# Patient Record
Sex: MALE | Race: BLACK OR AFRICAN AMERICAN | Employment: FULL TIME | URBAN - METROPOLITAN AREA
[De-identification: names, ages, dates, MRNs, and addresses within clinical notes are randomized per-mention and may not be internally consistent; named-entity substitution may affect disease eponyms.]

---

## 2021-11-29 ENCOUNTER — OFFICE VISIT (OUTPATIENT)
Dept: ORTHOPEDIC SURGERY | Age: 46
End: 2021-11-29
Payer: COMMERCIAL

## 2021-11-29 VITALS — BODY MASS INDEX: 31.08 KG/M2 | HEIGHT: 71 IN | WEIGHT: 222 LBS

## 2021-11-29 DIAGNOSIS — S76.011A STRAIN OF FLEXOR MUSCLE OF RIGHT HIP, INITIAL ENCOUNTER: ICD-10-CM

## 2021-11-29 DIAGNOSIS — S76.011A STRAIN OF HIP ADDUCTOR MUSCLE, RIGHT, INITIAL ENCOUNTER: ICD-10-CM

## 2021-11-29 DIAGNOSIS — M25.551 RIGHT HIP PAIN: Primary | ICD-10-CM

## 2021-11-29 DIAGNOSIS — R10.31 RIGHT GROIN PAIN: ICD-10-CM

## 2021-11-29 DIAGNOSIS — M79.604 RIGHT LEG PAIN: ICD-10-CM

## 2021-11-29 PROCEDURE — 99204 OFFICE O/P NEW MOD 45 MIN: CPT | Performed by: ORTHOPAEDIC SURGERY

## 2021-11-29 RX ORDER — METHYLPREDNISOLONE 4 MG/1
TABLET ORAL
Qty: 1 DOSE PACK | Refills: 0 | Status: SHIPPED | OUTPATIENT
Start: 2021-11-29 | End: 2022-07-24

## 2021-11-29 NOTE — PROGRESS NOTES
Anthony Palacios (: 1975) is a 51 y.o. male, patient, here for evaluation of the following chief complaint(s):  Leg Pain (right) and Hip Pain (right)       HPI:    He began having increased right hip and leg pain yesterday when he was injured on 2021. He gives no detail or description of his injury. The patient gives no detail or description of his discomfort. He does report that lying in bed makes pain worse and ice makes his pain better. He has been taking cyclobenzaprine and Tylenol for his discomfort as needed. He was not seen in the emergency room for his right leg or hip pain and reports no previous or related surgical intervention. No Known Allergies    Current Outpatient Medications   Medication Sig    methylPREDNISolone (MEDROL DOSEPACK) 4 mg tablet Per dose pack instructions    amoxicillin (AMOXIL) 500 mg capsule Take 2 Caps by mouth two (2) times a day.  butalbital-acetaminophen-caffeine (FIORICET) -40 mg per tablet Take 0.5-1 Tabs by mouth every six (6) hours as needed for Headache. Max Daily Amount: 4 Tabs.  ondansetron (ZOFRAN ODT) 4 mg disintegrating tablet Take 1 Tab by mouth every eight (8) hours as needed for Nausea. No current facility-administered medications for this visit. Past Medical History:   Diagnosis Date    Abdominal pain, right upper quadrant 2010    Cholelithiases 2010        Past Surgical History:   Procedure Laterality Date    HX CHOLECYSTECTOMY  8/20/10    lap jersey w/gram       No family history on file. Social History     Socioeconomic History    Marital status:      Spouse name: Not on file    Number of children: Not on file    Years of education: Not on file    Highest education level: Not on file   Occupational History    Not on file   Tobacco Use    Smoking status: Not on file    Smokeless tobacco: Not on file   Substance and Sexual Activity    Alcohol use:  Yes     Alcohol/week: 2.5 standard drinks     Types: 3 Cans of beer per week    Drug use: Not on file    Sexual activity: Not on file   Other Topics Concern    Not on file   Social History Narrative    Not on file     Social Determinants of Health     Financial Resource Strain:     Difficulty of Paying Living Expenses: Not on file   Food Insecurity:     Worried About Running Out of Food in the Last Year: Not on file    Felipe of Food in the Last Year: Not on file   Transportation Needs:     Lack of Transportation (Medical): Not on file    Lack of Transportation (Non-Medical): Not on file   Physical Activity:     Days of Exercise per Week: Not on file    Minutes of Exercise per Session: Not on file   Stress:     Feeling of Stress : Not on file   Social Connections:     Frequency of Communication with Friends and Family: Not on file    Frequency of Social Gatherings with Friends and Family: Not on file    Attends [employer] Services: Not on file    Active Member of [address] [place] [address] or Organizations: Not on file    Attends Club or Organization Meetings: Not on file    Marital Status: Not on file   Intimate Partner Violence:     Fear of Current or Ex-Partner: Not on file    Emotionally Abused: Not on file    Physically Abused: Not on file    Sexually Abused: Not on file   Housing Stability:     Unable to Pay for Housing in the Last Year: Not on file    Number of [place] in the Last Year: Not on file    Unstable Housing in the Last Year: Not on file       Review of Systems   All other systems reviewed and are negative. Vitals:  Ht 5' 10.5\" (1.791 m)   Wt 222 lb (100.7 kg)   BMI 31.40 kg/m²    Body mass index is 31.4 kg/m². Ortho Exam     The patient is well-developed and well-nourished. The patient presents today alert and oriented x3 and with a normal mood and affect. The patient stands with a normal weightbearing line but walks with a slightly antalgic gait because of his right hip and leg pain.     Right hip, the patient is nontender to palpation along the lateral aspect of the greater trochanter and the bursa, and has no soft tissue swelling. The patient is tender to palpation over the anterior aspect of the hip, and has discomfort with resisted hip flexion. The patient mild discomfort with impingement maneuvers, and the hip is stable. They full range of motion. They have a negative straight leg raise test. They have 5/5 strength, and are neurovascularly intact distally. There is no erythema, warmth or skin lesions present. ASSESSMENT/PLAN:      1. Right hip pain  -     XR HIP RT W OR WO PELV 2-3 VWS; Future  2. Right leg pain  3. Strain of hip adductor muscle, right, initial encounter  -     REFERRAL TO PHYSICAL THERAPY  4. Strain of flexor muscle of right hip, initial encounter  -     REFERRAL TO PHYSICAL THERAPY  5. Right groin pain     XR Results (most recent):  Results from Appointment encounter on 11/29/21    XR HIP RT W OR WO PELV 2-3 VWS    Narrative  View x-rays of his right hip show no evidence of a fracture or dislocation. There is no evidence of degenerative disease although he does have a small cam lesion. Below is the assessment and plan developed based on review of pertinent history, physical exam, labs, studies, and medications. **The patient was referred to formal physical therapy. **    We discussed the patient's right hip and leg pain and his signs, symptoms, physical exam, description of his pain, description of his injury, and x-rays are consistent with a abductor and hip flexor strain. The possible treatment options were discussed with the patient and we elected to treat his pain conservatively at this point with rest, ice, elevation, activity modification, and a Medrol Dosepak. The patient was given a prescription for Medrol Dosepak which he will use as directed. While taking his Medrol Dosepak, the patient will discontinue use of anti-inflammatory medication.   Once his Jovanyrol Steven Henson is complete, the patient may resume anti-inflammatories at that time. The patient was also referred to formal physical therapy to work on range of motion, strengthening, and stretching exercises. He will also work on these exercises with an at-home exercise program as pain tolerates. I will see him back in 4 weeks for reevaluation if he continues to have persistence of his pain however, if his pain has improved and is well-maintained I will see him back on an as-needed basis at which point we will discuss alternative and likely more aggressive treatment options. Return in about 4 weeks (around 12/27/2021), or if symptoms worsen or fail to improve, for Reevaluation and further discussion. .    An electronic signature was used to authenticate this note.   -- Bessie Kocher

## 2022-05-31 ENCOUNTER — OFFICE VISIT (OUTPATIENT)
Dept: ORTHOPEDIC SURGERY | Age: 47
End: 2022-05-31
Payer: COMMERCIAL

## 2022-05-31 DIAGNOSIS — M25.561 CHRONIC PAIN OF RIGHT KNEE: Primary | ICD-10-CM

## 2022-05-31 DIAGNOSIS — M70.41 PREPATELLAR BURSITIS OF RIGHT KNEE: ICD-10-CM

## 2022-05-31 DIAGNOSIS — M25.461: ICD-10-CM

## 2022-05-31 DIAGNOSIS — G89.29 CHRONIC PAIN OF RIGHT KNEE: Primary | ICD-10-CM

## 2022-05-31 PROCEDURE — 20610 DRAIN/INJ JOINT/BURSA W/O US: CPT | Performed by: ORTHOPAEDIC SURGERY

## 2022-05-31 PROCEDURE — 99214 OFFICE O/P EST MOD 30 MIN: CPT | Performed by: ORTHOPAEDIC SURGERY

## 2022-06-01 VITALS — BODY MASS INDEX: 30.24 KG/M2 | HEIGHT: 71 IN | WEIGHT: 216 LBS

## 2022-06-01 RX ORDER — TRIAMCINOLONE ACETONIDE 40 MG/ML
40 INJECTION, SUSPENSION INTRA-ARTICULAR; INTRAMUSCULAR ONCE
Status: COMPLETED | OUTPATIENT
Start: 2022-06-01 | End: 2022-06-01

## 2022-06-01 RX ORDER — BUPIVACAINE HYDROCHLORIDE 5 MG/ML
2 INJECTION, SOLUTION PERINEURAL ONCE
Status: COMPLETED | OUTPATIENT
Start: 2022-06-01 | End: 2022-06-01

## 2022-06-01 RX ADMIN — TRIAMCINOLONE ACETONIDE 40 MG: 40 INJECTION, SUSPENSION INTRA-ARTICULAR; INTRAMUSCULAR at 09:05

## 2022-06-01 RX ADMIN — BUPIVACAINE HYDROCHLORIDE 10 MG: 5 INJECTION, SOLUTION PERINEURAL at 09:04

## 2022-06-01 NOTE — PROGRESS NOTES
Susana Chávez (: 1975) is a 49 y.o. male, patient, here for evaluation of the following chief complaint(s):  Knee Pain (right)       HPI:    He began having increased right knee pain on 3/20/2022. The patient states that he slipped at his home on a wet floor. The patient describes his right knee pain now as moderate and dull. His right knee pain does not wake him up from sleep at night. The patient has been experiencing some swelling. He states that his right knee pain continues to get worse. He reports that kneeling makes his pain worse and not kneeling or putting any pressure on his knee makes his pain better. He has been taking over-the-counter medication. He has been wearing the brace for comfort, stability, and support. He was seen in urgent care facility prior to his visit today. The patient did have x-rays performed prior to his visit today. He reports no previous or related right knee surgery. No Known Allergies    Current Outpatient Medications   Medication Sig    methylPREDNISolone (MEDROL DOSEPACK) 4 mg tablet Per dose pack instructions    amoxicillin (AMOXIL) 500 mg capsule Take 2 Caps by mouth two (2) times a day.  butalbital-acetaminophen-caffeine (FIORICET) -40 mg per tablet Take 0.5-1 Tabs by mouth every six (6) hours as needed for Headache. Max Daily Amount: 4 Tabs.  ondansetron (ZOFRAN ODT) 4 mg disintegrating tablet Take 1 Tab by mouth every eight (8) hours as needed for Nausea. No current facility-administered medications for this visit. Past Medical History:   Diagnosis Date    Abdominal pain, right upper quadrant 2010    Cholelithiases 2010        Past Surgical History:   Procedure Laterality Date    HX CHOLECYSTECTOMY  8/20/10    lap jersey w/gram       History reviewed. No pertinent family history.      Social History     Socioeconomic History    Marital status:      Spouse name: Not on file    Number of children: Not on file  Years of education: Not on file    Highest education level: Not on file   Occupational History    Not on file   Tobacco Use    Smoking status: Not on file    Smokeless tobacco: Not on file   Substance and Sexual Activity    Alcohol use: Yes     Alcohol/week: 2.5 standard drinks     Types: 3 Cans of beer per week    Drug use: Not on file    Sexual activity: Not on file   Other Topics Concern    Not on file   Social History Narrative    Not on file     Social Determinants of Health     Financial Resource Strain:     Difficulty of Paying Living Expenses: Not on file   Food Insecurity:     Worried About Running Out of Food in the Last Year: Not on file    Felipe of Food in the Last Year: Not on file   Transportation Needs:     Lack of Transportation (Medical): Not on file    Lack of Transportation (Non-Medical): Not on file   Physical Activity:     Days of Exercise per Week: Not on file    Minutes of Exercise per Session: Not on file   Stress:     Feeling of Stress : Not on file   Social Connections:     Frequency of Communication with Friends and Family: Not on file    Frequency of Social Gatherings with Friends and Family: Not on file    Attends Protestant Services: Not on file    Active Member of [address] [place] [address] [employer] or Organizations: Not on file    Attends Club or Organization Meetings: Not on file    Marital Status: Not on file   Intimate Partner Violence:     Fear of Current or Ex-Partner: Not on file    Emotionally Abused: Not on file    Physically Abused: Not on file    Sexually Abused: Not on file   Housing Stability:     Unable to Pay for Housing in the Last Year: Not on file    Number of [place] in the Last Year: Not on file    Unstable Housing in the Last Year: Not on file       Review of Systems   All other systems reviewed and are negative. Vitals:  Ht 5' 10.5\" (1.791 m)   Wt 216 lb (98 kg)   BMI 30.55 kg/m²    Body mass index is 30.55 kg/m².     Ortho Exam     The patient is well-developed and well-nourished. The patient presents today in alert and oriented x3 with a normal mood and affect. The patient stands with a normal weightbearing line but walks with a slightly antalgic gait because of his right knee pain. Right knee, the patient is nontender to palpation along the medial and lateral joint lines, and has moderate size bursal effusion. There is soft tissue swelling within the prepatellar bursa, but no erythema or warmth. They are tender to palpation on the prepatella busra. They have no crepitus of the patellofemoral joint with range of motion. The patient has no discomfort with Mateusz's maneuvers, and the knee is stable. They have full range of motion. They have 5/5 strength, and are neurovascularly intact distally. There are no skin lesions present. ASSESSMENT/PLAN:      1. Chronic pain of right knee  2. Prepatellar bursitis of right knee  -     bupivacaine HCl (MARCAINE) 0.5 % (5 mg/mL) injection 10 mg; 10 mg (2 mL), Other, ONCE, 1 dose, On Wed 6/1/22 at 1000  -     triamcinolone acetonide (KENALOG-40) 40 mg/mL injection 40 mg; 40 mg, IntraBURSal, ONCE, 1 dose, On Wed 6/1/22 at 1000  3. Prepatellar effusion, right       Below is the assessment and plan developed based on review of pertinent history, physical exam, labs, studies, and medications. We discussed the patient's ongoing and worsening right knee pain. His signs, symptoms, physical exam, description of his pain, and x-rays from an outside facility are consistent with prepatellar bursitis and a patellar bursal effusion. The possible treatment options were discussed with the patient and because of an effusion present we elected to aspirate and inject his prepatellar bursa with cortisone today to try and alleviate some of his discomfort.   The risks and benefits of the aspiration and injection were discussed in detail with the patient and under sterile prep the patient's right prepatellar bursa was aspirated of approximately 35 ccs of bloody sanguinous fluid and injected with 2 ccs of 0.5% Sensorcaine and 1 cc of 40 mg/cc of Kenalog. He tolerated both the aspiration and injection well. I did encourage him to continue to ice and elevate when possible, modify his activity level based on his right knee pain, monitor his effusion, and use anti-inflammatory medication when necessary. He will work on range of motion, strengthening, and stretching exercises with an at-home exercise program as pain tolerates. He is to avoid any deep knee bend activities against resistance, squatting, kneeling, stairs, lunging, and high impact loading activities. I will see him back in 2 weeks for reevaluation if he has continued persistence of his pain or any redness around his prepatellar bursa however, if his pain has improved and is well-maintained I will see him back on an as-needed basis. Return in about 2 weeks (around 6/14/2022) for Re-evaluation and further discussion if he has persistence of his pain. An electronic signature was used to authenticate this note.   -- Lujean Lanes, MD

## 2022-07-18 ENCOUNTER — OFFICE VISIT (OUTPATIENT)
Dept: INTERNAL MEDICINE CLINIC | Age: 47
End: 2022-07-18
Payer: COMMERCIAL

## 2022-07-18 VITALS
TEMPERATURE: 98 F | BODY MASS INDEX: 29.41 KG/M2 | HEART RATE: 71 BPM | HEIGHT: 71 IN | WEIGHT: 210.1 LBS | OXYGEN SATURATION: 95 % | DIASTOLIC BLOOD PRESSURE: 86 MMHG | RESPIRATION RATE: 16 BRPM | SYSTOLIC BLOOD PRESSURE: 127 MMHG

## 2022-07-18 DIAGNOSIS — E11.9 CONTROLLED TYPE 2 DIABETES MELLITUS WITHOUT COMPLICATION, WITHOUT LONG-TERM CURRENT USE OF INSULIN (HCC): ICD-10-CM

## 2022-07-18 DIAGNOSIS — I10 PRIMARY HYPERTENSION: Primary | ICD-10-CM

## 2022-07-18 DIAGNOSIS — E66.3 OVERWEIGHT (BMI 25.0-29.9): ICD-10-CM

## 2022-07-18 DIAGNOSIS — J30.0 VASOMOTOR RHINITIS: ICD-10-CM

## 2022-07-18 DIAGNOSIS — Z00.00 PHYSICAL EXAM: ICD-10-CM

## 2022-07-18 DIAGNOSIS — E78.5 DYSLIPIDEMIA: ICD-10-CM

## 2022-07-18 DIAGNOSIS — N40.0 BENIGN PROSTATIC HYPERPLASIA WITHOUT LOWER URINARY TRACT SYMPTOMS: ICD-10-CM

## 2022-07-18 PROCEDURE — 99204 OFFICE O/P NEW MOD 45 MIN: CPT | Performed by: INTERNAL MEDICINE

## 2022-07-18 PROCEDURE — 99386 PREV VISIT NEW AGE 40-64: CPT | Performed by: INTERNAL MEDICINE

## 2022-07-18 RX ORDER — CLONIDINE 0.2 MG/24H
PATCH, EXTENDED RELEASE TRANSDERMAL
COMMUNITY

## 2022-07-18 RX ORDER — MONTELUKAST SODIUM 10 MG/1
10 TABLET ORAL AS NEEDED
COMMUNITY

## 2022-07-18 RX ORDER — AMLODIPINE BESYLATE 10 MG/1
TABLET ORAL
COMMUNITY

## 2022-07-18 RX ORDER — NAPROXEN 500 MG/1
TABLET ORAL
COMMUNITY

## 2022-07-18 RX ORDER — PANTOPRAZOLE SODIUM 40 MG/1
TABLET, DELAYED RELEASE ORAL
COMMUNITY
Start: 2022-07-07

## 2022-07-18 RX ORDER — HYDROCHLOROTHIAZIDE 50 MG/1
TABLET ORAL
COMMUNITY
Start: 2022-07-06

## 2022-07-18 RX ORDER — SUCRALFATE 1 G/1
TABLET ORAL
COMMUNITY
Start: 2022-07-06

## 2022-07-18 RX ORDER — LOSARTAN POTASSIUM 100 MG/1
TABLET ORAL
COMMUNITY
Start: 2022-07-06

## 2022-07-18 NOTE — PROGRESS NOTES
Identified pt with two pt identifiers(name and ). Reviewed record in preparation for visit and have obtained necessary documentation. Chief Complaint   Patient presents with    New Patient     est care for diabetes        Health Maintenance Due   Topic    Hepatitis C Screening     Depression Screen     COVID-19 Vaccine (1)    DTaP/Tdap/Td series (1 - Tdap)    Lipid Screen     Colorectal Cancer Screening Combo      Vitals:    22 1551   BP: 127/86   Pulse: 71   Resp: 16   Temp: 98 °F (36.7 °C)   TempSrc: Oral   SpO2: 95%   Weight: 210 lb 1.6 oz (95.3 kg)   Height: 5' 10.5\" (1.791 m)   PainSc:   0 - No pain       Pain Scale: 0 - No pain/10        Coordination of Care Questionnaire:  :     1. Have you been to the ER, urgent care clinic since your last visit? Hospitalized since your last visit? Yes [facility]x    2. Have you seen or consulted any other health care providers outside of the [address] [place] [address] since your last visit? Include any pap smears or colon screening. No    3. For patients aged 39-70: Has the patient had a colonoscopy / FIT/ Cologuard? Yes - no Care Gap present      If the patient is female:    4. For patients aged 41-77: Has the patient had a mammogram within the past 2 years? NA - based on age or sex      11. For patients aged 21-65: Has the patient had a pap smear?  NA - based on age or sex

## 2022-07-18 NOTE — PROGRESS NOTES
[facility]  [place] [address]  [address] [place] [address]  Phone:  [phone]  Fax: [phone]       Chief Complaint   Patient presents with    New Patient     est care for diabetes   . SUBJECTIVE:    Kashif Lal is a 49 y.o. male comes in as a new patient. He has a two-year plus history of hypertension and he is currently under the care of a cardiologist for treatment of this. At the same time he was diagnosed with diabetes mellitus. Blood sugars are generally less than 200. He was placed on metformin and he was intolerant of this. Since stopping it, his GI symptoms have completely abated which were primarily lower tract. He is overweight as he has been for a number of years. Current Outpatient Medications   Medication Sig Dispense Refill    amLODIPine (NORVASC) 10 mg tablet amlodipine 10 mg tablet   TAKE 1 TABLET BY MOUTH EVERY DAY      cloNIDine (CATAPRES) 0.2 mg/24 hr ptwk clonidine 0.2 mg/24 hr weekly transdermal patch   APPLY 1 PATCH EXTERNALLY TO THE SKIN EVERY WEEK      hydroCHLOROthiazide (HYDRODIURIL) 50 mg tablet       losartan (COZAAR) 100 mg tablet       naproxen (NAPROSYN) 500 mg tablet naproxen 500 mg tablet   TAKE 1 TABLET BY MOUTH TWICE DAILY      pantoprazole (PROTONIX) 40 mg tablet       sucralfate (CARAFATE) 1 gram tablet       montelukast (Singulair) 10 mg tablet Take 10 mg by mouth as needed. methylPREDNISolone (MEDROL DOSEPACK) 4 mg tablet Per dose pack instructions (Patient not taking: Reported on 7/18/2022) 1 Dose Pack 0    amoxicillin (AMOXIL) 500 mg capsule Take 2 Caps by mouth two (2) times a day. (Patient not taking: Reported on 7/18/2022) 40 Cap 0    butalbital-acetaminophen-caffeine (FIORICET) -40 mg per tablet Take 0.5-1 Tabs by mouth every six (6) hours as needed for Headache. Max Daily Amount: 4 Tabs.  (Patient not taking: Reported on 7/18/2022) 10 Tab 0    ondansetron (ZOFRAN ODT) 4 mg disintegrating tablet Take 1 Tab by mouth every eight (8) hours as needed for Nausea.  (Patient not taking: Reported on 7/18/2022) 6 Tab 0     Past Medical History:   Diagnosis Date    Abdominal pain, right upper quadrant 8/27/2010    Cholelithiases 8/27/2010    GERD (gastroesophageal reflux disease)     Sleep apnea      Past Surgical History:   Procedure Laterality Date    HX CHOLECYSTECTOMY  8/20/10    lap jersey w/gram     Allergies   Allergen Reactions    Pecan Nut Hives     Itchy throat         REVIEW OF SYSTEMS:  General: negative for - chills or fever  ENT: negative for - headaches, nasal congestion or tinnitus  Respiratory: negative for - cough, hemoptysis, shortness of breath or wheezing  Cardiovascular : negative for - chest pain, edema, palpitations or shortness of breath  Gastrointestinal: negative for - abdominal pain, blood in stools, heartburn or nausea/vomiting  Genito-Urinary: no dysuria, trouble voiding, or hematuria  Musculoskeletal: negative for - gait disturbance, joint pain, joint stiffness or joint swelling  Neurological: no TIA or stroke symptoms  Hematologic: no bruises, no bleeding, no swollen glands  Integument: no lumps, mole changes, nail changes or rash  Endocrine: no malaise/lethargy or unexpected weight changes      Social History     Socioeconomic History    Marital status:    Tobacco Use    Smoking status: Never Smoker    Smokeless tobacco: Never Used   Vaping Use    Vaping Use: Never used   Substance and Sexual Activity    Alcohol use: Yes     Comment: occ    Drug use: Never     Family History   Problem Relation Age of Onset    Diabetes Father     Neuropathy Father     Diabetes Maternal Grandmother     Stomach Cancer Maternal Grandfather     Diabetes Paternal Grandmother        OBJECTIVE:    Visit Vitals  /86   Pulse 71   Temp 98 °F (36.7 °C) (Oral)   Resp 16   Ht 5' 10.5\" (1.791 m)   Wt 95.3 kg (210 lb 1.6 oz)   SpO2 95%   BMI 29.72 kg/m²     CONSTITUTIONAL: well , well nourished, appears age appropriate  EYES: perrla, eom intact  ENMT:moist mucous membranes, pharynx clear  NECK: supple. Thyroid normal  RESPIRATORY: Chest: clear to ascultation and percussion   CARDIOVASCULAR: Heart: regular rate and rhythm  GASTROINTESTINAL: Abdomen: soft, bowel sounds active  HEMATOLOGIC: no pathological lymph nodes palpated  MUSCULOSKELETAL: Extremities: no edema, pulse 1+   INTEGUMENT: No unusual rashes or suspicious skin lesions noted. Nails appear normal.  NEUROLOGIC: non-focal exam   MENTAL STATUS: alert and oriented, appropriate affect      ASSESSMENT:  1. Primary hypertension    2. Controlled type 2 diabetes mellitus without complication, without long-term current use of insulin ([facility] [address]    3. Overweight (BMI 25.0-29.9)    4. Vasomotor rhinitis    5. Dyslipidemia    6. Physical exam    7. Benign prostatic hyperplasia without lower urinary tract symptoms        PLAN:  1. The patient's blood pressure appears to be acceptable today. 2. He does have a history of diabetes mellitus. He is on no medications. He has had this, as I stated earlier, for the last two years. I will await the results of his hemoglobin A1c.  3. He does need to lose weight, particularly in view of his dysmetabolic status created by his diabetes mellitus. This can be accomplished by eating meals, eliminating snacks, and avoiding the consumption of processed carbohydrates. 4. It appears that he has a vasomotor rhinitis as the etiology of his nasal congestion. A topical preparation would be in order, but he is well controlled with his current regimen. 5. His overall cardiovascular risk has been elevated, particularly given his age, weight and his existing comorbidities. Needless to say, I will await the results of his lipid values.       Orders Placed This Encounter    HEMOGLOBIN A1C WITH EAG    MICROALBUMIN, UR, RAND W/ MICROALB/CREAT RATIO    APOLIPOPROTEIN B    CBC WITH AUTOMATED DIFF    CRP, HIGH SENSITIVITY    LIPID PANEL    METABOLIC PANEL, COMPREHENSIVE    PROSTATE SPECIFIC AG    URINALYSIS W/ RFLX MICROSCOPIC    amLODIPine (NORVASC) 10 mg tablet    cloNIDine (CATAPRES) 0.2 mg/24 hr ptwk    hydroCHLOROthiazide (HYDRODIURIL) 50 mg tablet    losartan (COZAAR) 100 mg tablet    naproxen (NAPROSYN) 500 mg tablet    pantoprazole (PROTONIX) 40 mg tablet    sucralfate (CARAFATE) 1 gram tablet    montelukast (Singulair) 10 mg tablet               Amish Foster MD

## 2022-07-19 LAB
ALBUMIN SERPL-MCNC: 4 G/DL (ref 3.5–5)
ALBUMIN/GLOB SERPL: 1.2 {RATIO} (ref 1.1–2.2)
ALP SERPL-CCNC: 41 U/L (ref 45–117)
ALT SERPL-CCNC: 47 U/L (ref 12–78)
ANION GAP SERPL CALC-SCNC: 7 MMOL/L (ref 5–15)
APPEARANCE UR: CLEAR
AST SERPL-CCNC: 27 U/L (ref 15–37)
BASOPHILS # BLD: 0 K/UL (ref 0–0.1)
BASOPHILS NFR BLD: 1 % (ref 0–1)
BILIRUB SERPL-MCNC: 0.6 MG/DL (ref 0.2–1)
BILIRUB UR QL: NEGATIVE
BUN SERPL-MCNC: 14 MG/DL (ref 6–20)
BUN/CREAT SERPL: 12 (ref 12–20)
CALCIUM SERPL-MCNC: 9.5 MG/DL (ref 8.5–10.1)
CHLORIDE SERPL-SCNC: 103 MMOL/L (ref 97–108)
CHOLEST SERPL-MCNC: 149 MG/DL
CO2 SERPL-SCNC: 30 MMOL/L (ref 21–32)
COLOR UR: NORMAL
CREAT SERPL-MCNC: 1.21 MG/DL (ref 0.7–1.3)
CREAT UR-MCNC: 192 MG/DL
CRP SERPL HS-MCNC: 1.5 MG/L
DIFFERENTIAL METHOD BLD: ABNORMAL
EOSINOPHIL # BLD: 0.1 K/UL (ref 0–0.4)
EOSINOPHIL NFR BLD: 2 % (ref 0–7)
ERYTHROCYTE [DISTWIDTH] IN BLOOD BY AUTOMATED COUNT: 14.6 % (ref 11.5–14.5)
EST. AVERAGE GLUCOSE BLD GHB EST-MCNC: 177 MG/DL
GLOBULIN SER CALC-MCNC: 3.3 G/DL (ref 2–4)
GLUCOSE SERPL-MCNC: 101 MG/DL (ref 65–100)
GLUCOSE UR STRIP.AUTO-MCNC: NEGATIVE MG/DL
HBA1C MFR BLD: 7.8 % (ref 4–5.6)
HCT VFR BLD AUTO: 46.1 % (ref 36.6–50.3)
HDLC SERPL-MCNC: 40 MG/DL
HDLC SERPL: 3.7 {RATIO} (ref 0–5)
HGB BLD-MCNC: 15.3 G/DL (ref 12.1–17)
HGB UR QL STRIP: NEGATIVE
IMM GRANULOCYTES # BLD AUTO: 0 K/UL (ref 0–0.04)
IMM GRANULOCYTES NFR BLD AUTO: 0 % (ref 0–0.5)
KETONES UR QL STRIP.AUTO: NEGATIVE MG/DL
LDLC SERPL CALC-MCNC: 88.4 MG/DL (ref 0–100)
LEUKOCYTE ESTERASE UR QL STRIP.AUTO: NEGATIVE
LYMPHOCYTES # BLD: 2.9 K/UL (ref 0.8–3.5)
LYMPHOCYTES NFR BLD: 46 % (ref 12–49)
MCH RBC QN AUTO: 29.2 PG (ref 26–34)
MCHC RBC AUTO-ENTMCNC: 33.2 G/DL (ref 30–36.5)
MCV RBC AUTO: 88 FL (ref 80–99)
MICROALBUMIN UR-MCNC: 0.83 MG/DL
MICROALBUMIN/CREAT UR-RTO: 4 MG/G (ref 0–30)
MONOCYTES # BLD: 0.6 K/UL (ref 0–1)
MONOCYTES NFR BLD: 10 % (ref 5–13)
NEUTS SEG # BLD: 2.6 K/UL (ref 1.8–8)
NEUTS SEG NFR BLD: 41 % (ref 32–75)
NITRITE UR QL STRIP.AUTO: NEGATIVE
NRBC # BLD: 0 K/UL (ref 0–0.01)
NRBC BLD-RTO: 0 PER 100 WBC
PH UR STRIP: 6 [PH] (ref 5–8)
PLATELET # BLD AUTO: 265 K/UL (ref 150–400)
PMV BLD AUTO: 10.6 FL (ref 8.9–12.9)
POTASSIUM SERPL-SCNC: 3.4 MMOL/L (ref 3.5–5.1)
PROT SERPL-MCNC: 7.3 G/DL (ref 6.4–8.2)
PROT UR STRIP-MCNC: NEGATIVE MG/DL
PSA SERPL-MCNC: 6.5 NG/ML (ref 0.01–4)
RBC # BLD AUTO: 5.24 M/UL (ref 4.1–5.7)
SODIUM SERPL-SCNC: 140 MMOL/L (ref 136–145)
SP GR UR REFRACTOMETRY: 1.02 (ref 1–1.03)
TRIGL SERPL-MCNC: 103 MG/DL (ref ?–150)
UROBILINOGEN UR QL STRIP.AUTO: 0.2 EU/DL (ref 0.2–1)
VLDLC SERPL CALC-MCNC: 20.6 MG/DL
WBC # BLD AUTO: 6.3 K/UL (ref 4.1–11.1)

## 2022-07-20 LAB — APO B SERPL-MCNC: 84 MG/DL

## 2022-07-24 RX ORDER — POTASSIUM CHLORIDE 750 MG/1
10 TABLET, FILM COATED, EXTENDED RELEASE ORAL
Qty: 30 TABLET | Refills: 11 | Status: SHIPPED | OUTPATIENT
Start: 2022-07-24

## 2022-08-17 ENCOUNTER — OFFICE VISIT (OUTPATIENT)
Dept: INTERNAL MEDICINE CLINIC | Age: 47
End: 2022-08-17
Payer: COMMERCIAL

## 2022-08-17 VITALS
OXYGEN SATURATION: 97 % | HEART RATE: 70 BPM | TEMPERATURE: 98.4 F | WEIGHT: 214.9 LBS | HEIGHT: 71 IN | BODY MASS INDEX: 30.09 KG/M2 | DIASTOLIC BLOOD PRESSURE: 80 MMHG | SYSTOLIC BLOOD PRESSURE: 118 MMHG | RESPIRATION RATE: 16 BRPM

## 2022-08-17 DIAGNOSIS — E11.9 CONTROLLED TYPE 2 DIABETES MELLITUS WITHOUT COMPLICATION, WITHOUT LONG-TERM CURRENT USE OF INSULIN (HCC): Primary | ICD-10-CM

## 2022-08-17 DIAGNOSIS — I10 PRIMARY HYPERTENSION: ICD-10-CM

## 2022-08-17 DIAGNOSIS — R97.20 ELEVATED PSA: ICD-10-CM

## 2022-08-17 DIAGNOSIS — N41.0 ACUTE PROSTATITIS: ICD-10-CM

## 2022-08-17 PROCEDURE — 3051F HG A1C>EQUAL 7.0%<8.0%: CPT | Performed by: INTERNAL MEDICINE

## 2022-08-17 PROCEDURE — 99214 OFFICE O/P EST MOD 30 MIN: CPT | Performed by: INTERNAL MEDICINE

## 2022-08-17 NOTE — PROGRESS NOTES
Lynda Sharpe is a 49 y.o. male  Visit Vitals  /80 (BP 1 Location: Left upper arm, BP Patient Position: Sitting, BP Cuff Size: Adult)   Pulse 70   Temp 98.4 °F (36.9 °C) (Oral)   Resp 16   Ht 5' 10.5\" (1.791 m)   Wt 214 lb 14.4 oz (97.5 kg)   SpO2 97%   BMI 30.40 kg/m²     Chief Complaint   Patient presents with    Diabetes     Patient here for follow up diabetes     1. Have you been to the ER, urgent care clinic since your last visit? Hospitalized since your last visit? No    2. Have you seen or consulted any other health care providers outside of the [address] [place] [address] since your last visit? Include any pap smears or colon screening.  No

## 2022-08-21 PROBLEM — I10 PRIMARY HYPERTENSION: Status: ACTIVE | Noted: 2022-08-21

## 2022-08-21 PROBLEM — R97.20 ELEVATED PSA: Status: ACTIVE | Noted: 2022-08-21

## 2022-08-21 PROBLEM — N41.0 ACUTE PROSTATITIS: Status: ACTIVE | Noted: 2022-08-21

## 2022-08-21 PROBLEM — E11.9 CONTROLLED TYPE 2 DIABETES MELLITUS WITHOUT COMPLICATION, WITHOUT LONG-TERM CURRENT USE OF INSULIN (HCC): Status: ACTIVE | Noted: 2022-08-21

## 2022-08-21 RX ORDER — CIPROFLOXACIN 500 MG/1
500 TABLET ORAL 2 TIMES DAILY
Qty: 28 TABLET | Refills: 0 | Status: SHIPPED | OUTPATIENT
Start: 2022-08-21 | End: 2022-09-04

## 2022-08-21 NOTE — PROGRESS NOTES
[address] [facility]  [place] [address]  [address] [address] [facility] [id number]  Phone:  [phone]  Fax: [phone]       Chief Complaint   Patient presents with    Diabetes     Patient here for follow up diabetes   . SUBJECTIVE:    Jamari Maynard is a 49 y.o. male comes in for return visit stating that he was unable to get the Chani Proud for cost reasons. The abnormalities on his labs are predominantly related to the PSA, which is a bit elevated. I suspect, given his age, this probably represents prostatitis, although occult cancer might well be in the mix. He has a past history of primary hypertension currently. His overcall cardiovascular risk remains relatively low. Current Outpatient Medications   Medication Sig Dispense Refill    ciprofloxacin HCl (CIPRO) 500 mg tablet Take 1 Tablet by mouth two (2) times a day for 14 days. 28 Tablet 0    empagliflozin (Jardiance) 10 mg tablet Take 1 Tablet by mouth daily. 30 Tablet 11    potassium chloride SR (KLOR-CON 10) 10 mEq tablet Take 1 Tablet by mouth daily (after breakfast). 30 Tablet 11    amLODIPine (NORVASC) 10 mg tablet amlodipine 10 mg tablet   TAKE 1 TABLET BY MOUTH EVERY DAY      cloNIDine (CATAPRES) 0.2 mg/24 hr ptwk clonidine 0.2 mg/24 hr weekly transdermal patch   APPLY 1 PATCH EXTERNALLY TO THE SKIN EVERY WEEK      hydroCHLOROthiazide (HYDRODIURIL) 50 mg tablet       losartan (COZAAR) 100 mg tablet       naproxen (NAPROSYN) 500 mg tablet naproxen 500 mg tablet   TAKE 1 TABLET BY MOUTH TWICE DAILY      pantoprazole (PROTONIX) 40 mg tablet       sucralfate (CARAFATE) 1 gram tablet       montelukast (SINGULAIR) 10 mg tablet Take 10 mg by mouth as needed.        Past Medical History:   Diagnosis Date    Abdominal pain, right upper quadrant 08/27/2010    Cholelithiases 08/27/2010    GERD (gastroesophageal reflux disease)     H/O colonoscopy     Sleep apnea      Past Surgical History:   Procedure Laterality Date    HX CHOLECYSTECTOMY  8/20/10    lap jersey w/gram     Allergies   Allergen Reactions    Pecan Nut Hives     Itchy throat         REVIEW OF SYSTEMS:  General: negative for - chills or fever  ENT: negative for - headaches, nasal congestion or tinnitus  Respiratory: negative for - cough, hemoptysis, shortness of breath or wheezing  Cardiovascular : negative for - chest pain, edema, palpitations or shortness of breath  Gastrointestinal: negative for - abdominal pain, blood in stools, heartburn or nausea/vomiting  Genito-Urinary: no dysuria, trouble voiding, or hematuria  Musculoskeletal: negative for - gait disturbance, joint pain, joint stiffness or joint swelling  Neurological: no TIA or stroke symptoms  Hematologic: no bruises, no bleeding, no swollen glands  Integument: no lumps, mole changes, nail changes or rash  Endocrine: no malaise/lethargy or unexpected weight changes      Social History     Socioeconomic History    Marital status:     Number of children: 3   Occupational History    Occupation: HVAC   Tobacco Use    Smoking status: Never    Smokeless tobacco: Never   Vaping Use    Vaping Use: Never used   Substance and Sexual Activity    Alcohol use: Yes     Comment: occ    Drug use: Never     Family History   Problem Relation Age of Onset    Diabetes Father     Neuropathy Father     Diabetes Maternal Grandmother     Stomach Cancer Maternal Grandfather     Diabetes Paternal Grandmother        OBJECTIVE:    Visit Vitals  /80 (BP 1 Location: Left upper arm, BP Patient Position: Sitting, BP Cuff Size: Adult)   Pulse 70   Temp 98.4 °F (36.9 °C) (Oral)   Resp 16   Ht 5' 10.5\" (1.791 m)   Wt 214 lb 14.4 oz (97.5 kg)   SpO2 97%   BMI 30.40 kg/m²     CONSTITUTIONAL: well , well nourished, appears age appropriate  EYES: perrla, eom intact  ENMT:moist mucous membranes, pharynx clear  NECK: supple.  Thyroid normal  RESPIRATORY: Chest: clear to ascultation and percussion   CARDIOVASCULAR: Heart: regular rate and rhythm  GASTROINTESTINAL: Abdomen: soft, bowel sounds active  HEMATOLOGIC: no pathological lymph nodes palpated  MUSCULOSKELETAL: Extremities: no edema, pulse 1+   INTEGUMENT: No unusual rashes or suspicious skin lesions noted. Nails appear normal.  NEUROLOGIC: non-focal exam   MENTAL STATUS: alert and oriented, appropriate affect      ASSESSMENT:  1. Controlled type 2 diabetes mellitus without complication, without long-term current use of insulin ([address]    2. Primary hypertension    3. Acute prostatitis    4. Elevated PSA        PLAN:  1. I will try the patient on Jardiance again with a coupon to see if this will discount it significantly. The patient agrees. 2. His blood pressure is acceptable today. No adjustments are made. 3. His PSA was a bit elevated at age 49. I would treat him as if he has prostatitis for the next two weeks and repeat his urinalysis at one month and his PSA when he returns to see me in three months. Orders Placed This Encounter    empagliflozin (Jardiance) 10 mg tablet    ciprofloxacin HCl (CIPRO) 500 mg tablet         Follow-up and Dispositions    Return in about 3 months (around 11/17/2022), or RTO 1 month for U/A.            Roberto Read MD

## 2022-10-21 DIAGNOSIS — E11.9 CONTROLLED TYPE 2 DIABETES MELLITUS WITHOUT COMPLICATION, WITHOUT LONG-TERM CURRENT USE OF INSULIN (HCC): ICD-10-CM

## 2023-07-13 ENCOUNTER — TELEPHONE (OUTPATIENT)
Facility: CLINIC | Age: 48
End: 2023-07-13

## 2023-07-13 NOTE — TELEPHONE ENCOUNTER
[phone] (home)     Attempted to call patient he is due for DM follow up.  VM is full cant leave message